# Patient Record
Sex: FEMALE | Race: WHITE
[De-identification: names, ages, dates, MRNs, and addresses within clinical notes are randomized per-mention and may not be internally consistent; named-entity substitution may affect disease eponyms.]

---

## 2017-05-18 NOTE — MM
Reason for exam: screening  (asymptomatic).

Baseline mammogram.



History:

Patient is postmenopausal.

Took hormonal contraceptives for 5 years beginning at age 20.  Taking estrogen for

2 months beginning at age 39.  Taking progesterone for 2 months beginning at age 

39.



Physical Findings:

Nurse Summary: Findings indicate a 1cm nodule in the right breast at 11:30, and a 

1cm nodule in the left breast in the 1 o'clock position (nurse dw).



MG Screening Mammo w CAD

Bilateral CC and MLO view(s) were taken.

The breast tissue is heterogeneously dense. This may lower the sensitivity of 

mammography.

Finding: There are typically benign round, diffuse and grouped calcifications in 

both breasts. There is no discrete abnormality.



These results were verbally communicated with the patient and result sheet given 

to the patient on 05/17/17.





ASSESSMENT: Incomplete: need additional imaging evaluation, BI-RAD 0



RECOMMENDATION:

Ultrasound of both breasts.

Palpable by nurse.

## 2017-05-18 NOTE — USB
Reason for exam: additional evaluation requested from abnormal screening.



History:

Patient is postmenopausal.

Took hormonal contraceptives for 5 years beginning at age 20.  Taking estrogen for

2 months beginning at age 39.  Taking progesterone for 2 months beginning at age 

39.



US Breast Workup Limited JED

Right breast ultrasound demonstrates no cystic or solid lesion seen.



Left breast ultrasound demonstrates no cystic or solid lesion seen.



These results were verbally communicated with the patient and result sheet given 

to the patient on 05/17/17.





ASSESSMENT: Negative, BI-RAD 1



RECOMMENDATION:

Routine screening mammogram of both breasts in 1 year.

Back on schedule.

## 2018-06-05 ENCOUNTER — HOSPITAL ENCOUNTER (OUTPATIENT)
Dept: HOSPITAL 47 - RADMAMWWP | Age: 68
Discharge: HOME | End: 2018-06-05
Payer: MEDICARE

## 2018-06-05 DIAGNOSIS — Z12.31: Primary | ICD-10-CM

## 2018-06-05 PROCEDURE — 77067 SCR MAMMO BI INCL CAD: CPT

## 2018-06-05 PROCEDURE — 77063 BREAST TOMOSYNTHESIS BI: CPT

## 2018-06-06 NOTE — MM
Reason for exam: screening  (asymptomatic).

Last mammogram was performed 1 year and 1 month ago.



History:

Patient is postmenopausal.

Took hormonal contraceptives for 5 years beginning at age 20.  Taking estrogen for

2 months beginning at age 39.  Taking progesterone for 2 months beginning at age 

39.



Physical Findings:

A clinical breast exam by your physician is recommended on an annual basis and 

results should be correlated with mammographic findings.



MG 3D Screening Mammo W/Cad

Bilateral CC and MLO view(s) were taken.

Prior study comparison: May 17, 2017, bilateral MG screening mammo w CAD.

The breast tissue is heterogeneously dense. This may lower the sensitivity of 

mammography.  Stable benign calcifications. There is no discrete abnormality.  No 

significant changes when compared with prior studies.





ASSESSMENT: Benign, BI-RAD 2



RECOMMENDATION:

Routine screening mammogram of both breasts in 1 year.